# Patient Record
Sex: FEMALE | Race: WHITE | HISPANIC OR LATINO | Employment: FULL TIME | URBAN - METROPOLITAN AREA
[De-identification: names, ages, dates, MRNs, and addresses within clinical notes are randomized per-mention and may not be internally consistent; named-entity substitution may affect disease eponyms.]

---

## 2022-02-27 ENCOUNTER — HISTORICAL (OUTPATIENT)
Dept: ADMINISTRATIVE | Facility: HOSPITAL | Age: 32
End: 2022-02-27

## 2022-03-01 ENCOUNTER — HISTORICAL (OUTPATIENT)
Dept: ADMINISTRATIVE | Facility: HOSPITAL | Age: 32
End: 2022-03-01

## 2022-03-04 ENCOUNTER — HISTORICAL (OUTPATIENT)
Dept: ADMINISTRATIVE | Facility: HOSPITAL | Age: 32
End: 2022-03-04

## 2022-03-07 ENCOUNTER — HISTORICAL (OUTPATIENT)
Dept: INTERNAL MEDICINE | Facility: CLINIC | Age: 32
End: 2022-03-07

## 2022-03-07 LAB — B-HCG FREE SERPL-ACNC: NORMAL [IU]/L

## 2022-04-19 ENCOUNTER — PATIENT OUTREACH (OUTPATIENT)
Dept: EMERGENCY MEDICINE | Facility: HOSPITAL | Age: 32
End: 2022-04-19

## 2022-04-22 ENCOUNTER — PATIENT OUTREACH (OUTPATIENT)
Dept: EMERGENCY MEDICINE | Facility: HOSPITAL | Age: 32
End: 2022-04-22
Payer: MEDICAID

## 2022-05-02 ENCOUNTER — PATIENT OUTREACH (OUTPATIENT)
Dept: EMERGENCY MEDICINE | Facility: HOSPITAL | Age: 32
End: 2022-05-02
Payer: MEDICAID

## 2022-05-14 NOTE — PROGRESS NOTES
Patient:   Floridalma Desai             MRN: [id number]            FIN: [id number]               Age:   31 years     Sex:  Female     :  1990   Associated Diagnoses:   None   Author:   Kush PEREZ, Amber KENNEDY      [facility] [place] trending s/p dx scope for suspected ectopic which was not discovered at time of surgery (HCG 14k with no IUP on sono)  Discussed positive trend favoring normal (possible multiples) pregnancy 14k  Fri -> 17k Sat -> today 29k  Precautions reinforced to return ED for any pain or vaginal bleeding, voiced undesrstanding  Has initial OB appt for 3/21, encouraged to keep this appt date and likely would have U/S at that time  All questions answered

## 2022-06-03 ENCOUNTER — PATIENT OUTREACH (OUTPATIENT)
Dept: EMERGENCY MEDICINE | Facility: HOSPITAL | Age: 32
End: 2022-06-03
Payer: MEDICAID

## 2022-06-03 NOTE — PROGRESS NOTES
Spoke to pt for f/u call. Discussed upcoming scheduled appts and importance of f/u ob prenatal care, mental health and all providers and ancillary care. Pt denies si/hi and mental health crisis and to visit ED for mental health si/hi and mental health crisis. Pt reports that she stop seeing mental health provider, stressed importance to call for appt with menta health provider and to discuss this with her ob to get recommendations. Advise and stressed importance for pt to avoid substance/drug/alcohol/smoking use and risk with use during pregnancy and anytime. Also advised pt to also discussed with her ob mental health and drug use during pregnancy to get recommendations. Pt request to mail her [facility] education/resource on benefits of pcp, prenatal care, eating plan for pregnant women, smoking during pregnancy, dental provider list and mental health/substance abuse resources again to her, that she did not get it, she was having issues with getting her mail, but has been corrected. Verified pt address with pt. Encouragement given to pt. Pt voices understanding to instructions given and appreciation.      Appointments   Follow-Up Appt Scheduled :   No   Follow-Up Appointment Status :   Has not had opportunity to call for appointment   PCP Visit Within Year :   Yes   PCP Visit Upcoming Reason :   Sick   PCP Visit One Year Date :   3/14/2022 CDT   Follow-Up Specialist Appt Scheduled :   Yes   Type of Specialist :   ARPAN ROJAS NP (MENTAL HEALTH PROVIDER) pt reports stop seeing, encourage and stressed importance of f/u care with mental health and to call and obtain appt  DOREEN KATE MD (OB) LV-5/31/22; NV-6/14/22     Providers Patient Visited with in the Last Year :     [facility] (PCP)  ARPAN ROJAS NP (MENTAL HEALTH)  DOREEN KATE MD

## 2022-06-13 ENCOUNTER — PATIENT OUTREACH (OUTPATIENT)
Dept: EMERGENCY MEDICINE | Facility: HOSPITAL | Age: 32
End: 2022-06-13
Payer: MEDICAID

## 2022-06-13 NOTE — PROGRESS NOTES
Appointment Reminder:    Spoke to pt and remind of appt for 6/14/22 with ob Dr. Jesus, stressed importance of prenatal care and attending appt. Pt denies si/hi and mental health crisis. Pt does report occasional abdominal pain, denies any pain at this time, advised pt to call her ob and notify and get recommendation and to visit ED for symptoms. Pt voices understanding to instructions given and appreciation.

## 2022-06-15 ENCOUNTER — PATIENT OUTREACH (OUTPATIENT)
Dept: EMERGENCY MEDICINE | Facility: HOSPITAL | Age: 32
End: 2022-06-15
Payer: MEDICAID

## 2022-06-15 NOTE — PROGRESS NOTES
Spoke to pt for f/u call, doing well, no compliant. Pt reports she did attend OB appt on yesterday 6/14/22 with Dr. Jesus, next visit 7/12/22 at 2:45pm. Pt reports she is feeling better, pt denies abdominal pt, si/hi and mental health crisis. Pt also reports she her OB put her on medication Vistaril and she will  today to start taking. Discussed medication, health pregnancy diet compliance and benefits of OB and prenatal care, ED utilization and stressed importance for pt to avoid substance/drug/alcohol/smoking use and risk with use during pregnancy and anytime. Pt voices understanding to instructions given and appreciation. Pt voices understanding to instructions given and appreciation.     Appointments   Follow-Up Appt Scheduled :   No   Follow-Up Appointment Status :   Has not had opportunity to call for appointment   PCP Visit Within Year :   Yes   PCP Visit Upcoming Reason :   Sick   PCP Visit One Year Date :   3/14/2022 CDT   Follow-Up Specialist Appt Scheduled :   Yes   Type of Specialist :   ARPAN ROJAS NP (MENTAL HEALTH PROVIDER) pt reports stop seeing, encourage and stressed importance of f/u care with mental health and to call and obtain appt  DOREEN JESUS MD (OB) LV-6/14/22; NV-7/12/22 at 2:45pm     Providers Patient Visited with in the Last Year :     [facility] (PCP)  ARPAN ROJAS NP (MENTAL HEALTH)  DOREEN JESUS MD

## 2022-07-20 ENCOUNTER — PATIENT OUTREACH (OUTPATIENT)
Dept: EMERGENCY MEDICINE | Facility: HOSPITAL | Age: 32
End: 2022-07-20
Payer: MEDICAID

## 2022-07-20 NOTE — PROGRESS NOTES
original encounter date 4/22/22 in [facility] EMR information placed in Epic for coninuity purposes.               [facility] Entered On:  4/22/2022 11:32 CDT    Performed On:  4/22/2022 10:56 CDT by Mariana Gilbert LPN               Discharge Past 30 Days   Visit to the Hospital in the Last 30 Days :   Emergency department (ED) visit   Reason for Choosing ED for Care :   Believes the problem too serious for doctor office or clinic   Perception of Change in Health Status DC :   Improving   Discharged To :   Home independently   DC Instructions :   Received discharge instructions , Understands discharge instructions    Education Provided :   ED utilization, Importance of keeping appointments, Community resources, Medication Compliance, Diet Compliance, Other: BENEFITS OF PCP, OB AND MENTAL HEALTH PROVIDER AND ALL PROVIDERS AND ANCILLARY CARE   (Comment: healthy pregnancy eating diet [Mariana Gilbert LPN - 4/22/2022 10:56 CDT] )   Discharge Past 30 Days Addntl Comments :   SPOKE TO PT FOR F/U ED VISIT. ADVISED PT TO FOLLOW DISCHARGE INSTRUCTIONS. STRESSED IMPORTANCE OF F/U CARE WITH OB, MENTAL HEALTH AND PCP AND ATTENDING ALL SCHEDULED APPTS AND BEING COMPLIANT WITH CARE AND TREATMENT PLAN. PT VOICES UNDERSTANDING TO INSTRUCTIONS GIVEN AND APPRECIATION.     Patient pregnant :   Yes   (Comment: EDC 10/31/22; OB--DR. DOREEN KATE; LV-4/5/22; NV-5/3/22 [Mariana Gilbert LPN - 4/22/2022 10:56 CDT] )   Mariana Gilbert LPN - 4/22/2022 10:56 CDT   Barriers to Care   SDoH Eat Less Than You Should :   No   SDoH Shut Off Services to Your Home :   Yes   SDoH No Regular Place to Live :   No   SDoH Needed Provider but Costs too Much :   No   SDoH Help Reading and Writing Paper Work :   No   SDoH Feel Lonely Often :   No   SDoH Missed Appt/Meds- No Transportation :   No   SDoH Call Dr To Be Seen Right Away :   Yes   SDoH Medical Problems Cause ED Visit :   No   SDoH Other Problems Affecting  Health :   Yes   SDoH Other Problems Details :   Drug abuse, Mental health concerns   (Comment: PT REPORTS SHE HAS USE MARJUANNA DURING THIS PREGNACY, ENCOURAGE AND ADVISED PT TO AVOID SUBSTANCE(DRUG/ALCOHOL USE), DISCUSSED RISK WITH DOING SO. ADVISED PT TO DISCUSS THIS WITH HER OB AND MENTAL HEALTH PROVIDER TO GET RECOMMENDATIONS. PT VOICES UNDERSTANDING TO INSTRUCTIONS GIVEN. [Mariana Gilbert LPN - 4/22/2022 10:56 CDT] )   SDoH Reviewed :   Yes   SDoH Dentist Seen in Last Year :   No   (Comment: DENTAL PROVIDER [employer] MAIL TO PT PER PT REQUEST [Mariana Gilbert LPN - 4/22/2022 10:56 CDT] )   Mariana Gilbert LPN - 4/22/2022 10:56 CDT   Prescriptions   Medication Reconcilation Completed :   Unknown   Medication Prescriptions Filled After DC :   Confirms all medications filled , Confirms taking medications as prescribed    Questions About Meds Prescribed at DC :   Denies any questions or concerns                    Mariana Gilbert LPN - 4/22/2022 10:56 CDT   Appointments   Follow-Up Appt Scheduled :   No   Follow-Up Appointment Status :   Has not had opportunity to call for appointment   PCP Visit Within Year :   Yes   PCP Visit Upcoming Reason :   Sick   PCP Visit One Year Date :   3/14/2022 CDT   Follow-Up Specialist Appt Scheduled :   Yes   Type of Specialist :   ARPAN ROJAS NP (MENTAL HEALTH PROVIDER)  DOREEN KAET MD (OB) LV-4/5/22; NV-5/3/22   Mariana Gilbert LPN - 4/22/2022 10:56 CDT   Navigation   Initial Assesment Completed By :   Phone   ED FIN :   [id number]   [facility] Navigation Call Log :   First follow up call   Transportation Arrangements Made :   Yes   Providers Patient Visited Last Year :   [facility] (PCP)  ARPAN ROJAS NP (MENTAL HEALTH)  DOREEN KATE MD (OB)     Root Causes for High-ED Utilization :   Chronic conditions, Mental Health Care, Drug use   Plan: :   Educated on appropriate ED utilization, Educated on alternate  means of health care; ie urgent care when PCP not available, Educated on importance of follow up care with PCP, Referred to community resources; ie [place], Educated on importance of follow up preventative dental care with dentist, Other: MAIL [facility] EDUCATION/RESOURCE TO PT PER PT REQUEST, VERIFIED PT ADDRESS WITH PT   Participation in Activity Designed to Address Lack of Annual Ambulatory or Preventative Care Visit? :   Yes   Participation in Activity Designed to Address Avoidable ED Utilization? :   Yes   During Current Measurement Year, Did Enrollee Receive Education Regarding Outpatient Primary Care Options? :   Yes   During Current Measurement Year, Did Enrollee Receive an Appt Reminder 24-48 Hours Before a Scheduled Appt? :   Yes   During Current Measurement Year, Did the Network Provider Schedule and Appt or Provide a Referral to Enrollee? :   Yes   Education Provided :   Verbal, Written   Ofelia TIRADO, Mariana Rubio - 4/22/2022 10:56 CDT

## 2022-07-20 NOTE — PATIENT INSTRUCTIONS
* Final Report *    Education Note (Verified)  Patient Education Materials Follows:  Why Should I Have My Own Doctor or Nurse Practitioner (PCP) to Take Care of Me  What is a PCP (Primary Care Provider)?                    A primary care provider is a doctor or nurse practitioner who you can call for an appointment and will see you when you are sick.    You will also be seen at scheduled appointment times during the year to check on your diabetes, or high blood pressure, or heart disease.    Why see the same PCP (doctor/nurse practitioner)?  You can be seen faster when you are sick                                                                                                                                                                                                                                                                                                          You, the PCP (doctor/nurse practitioner) and the office staff get to know each other; you begin to trust them to care for you. You take part in your health choices.   All of you together are a team.    Your medicine is looked at every time you visit, to be sure you are taking the medicine, as the PCP (doctor/nurse practitioner) ordered.  Your PCP (doctor/nurse practitioner) and their staff help keep you healthy and out of the hospital.  They can catch sicknesses earlier by ordering tests once a year to stop or prevent the sickness from getting worse.                                                     Your PCP (doctor/nurse practitioner) can send you to providers who specialize (heart/bone/lung) if you need.  They and their office staff help keep track of your seeing other providers (doctors/nurse practitioners) and tests (CT/ MRIs/ X Rays)) taken.                                          PCPs want you to stay healthy.  Let us care for you.                                            How does drug/alcohol abuse affect your  health?    Drug and alcohol abuse can cause your breathing to slow down enough for you to stop breathing. It can cause you to see things that are not there. Keep you from sleeping for days.  Continued use of drugs and alcohol will weaken your heart, liver, and kidneys.    Mental Health/Substance Abuse  [facility]:  [phone]  [facility]: [phone]  [facility]: [phone]    [facility]  [address] [place] [address]  Phone: [phone]  Fax:  [phone]    [facility]  [address]  [address]  Phone:  [phone]  Fax:  [phone]    [facility]  [address] [place]  [address]  Phone:  [phone]  Fax:  [phone]    [facility]  [address] [place]  [address]  Phone:  [phone]  Fax:  [phone]    [facility]  [address] [place] [address]  Phone: [phone]  Fax:  [phone]    [facility]  [address] [place]  [address]  Phone:  [phone]  Fax: [phone]    [facility]  [address]  [place], [place]  Phone: [phone]    [facility]  [address] [place] [address] [address]  [place], [place]  Phone: [phone]                            Why is taking care of your mouth/teeth/gums important?                                                                             Your mouth is the opening to your body.  If not kept clean, it can let in sickness to the rest of your body.                                                 Oral Health Care (Dentists)  [facility].            [address] [place] [address], [phone]  [facility],             [address] [facility] [place] [address] [phone]  [facility]            [address] [place] [address], [phone]  [facility]            [address] [place] [address], [phone]  [facility]            [address] [place] [address] [phone]  [facility]            [address] [place] [address] [phone]  [facility]             [address] [facility] [address], [place] [address] [phone]  [facility], [address] [facility] [place] [place] [address] [phone]  [facility]             [address] [place], [place] [id number] [phone]  [facility]; [place]: [phone]   [facility]; [place]: [phone]    [facility], [address] [facility] [address], [phone]  (Accepts [employer], HB and [employer])  [facility];[address] [place] [address]; [phone]  (ACCEPTS [facility], HB AND [employer])             Obstetrics and Gynecology  Prenatal Care  Prenatal care is health care during pregnancy. It helps you and your unborn baby (fetus) stay as healthy as possible. Prenatal care may be provided by a midwife, a family practice health care provider, or a childbirth and pregnancy specialist (obstetrician).  How does this affect me?  During pregnancy, you will be closely monitored for any new conditions that might develop. To lower your risk of pregnancy complications, you and your health care provider will talk about any underlying conditions you have.  How does this affect my baby?  Early and consistent prenatal care increases the chance that your baby will be healthy during pregnancy. Prenatal care lowers the risk that your baby will be:   Born early (prematurely).   Smaller than expected at birth (small for gestational age).  What can I expect at the first prenatal  care visit?  Your first prenatal care visit will likely be the longest. You should schedule your first prenatal care visit as soon as you know that you are pregnant. Your first visit is a good time to talk about any questions or concerns you have about pregnancy. At your visit, you and your health care provider will talk about:   Your medical history, including:  ? Any past pregnancies.  ? Your family's medical history.  ? The baby's father's medical history.  ? Any long-term (chronic) health conditions you have and how you manage them.  ? Any surgeries or procedures you have had.  ? Any current over-the-counter or prescription medicines, herbs, or supplements you are taking.   Other factors that could pose a risk to your baby, including:   Your home setting and your stress levels, including:  ? Exposure to abuse or violence.  ? Household financial strain.  ? Mental health conditions you have.   Your daily health habits, including diet and exercise.  Your health care provider will also:   Measure your weight, height, and blood pressure.   Do a physical exam, including a pelvic and breast exam.   Perform blood tests and urine tests to check for:  ? Urinary tract infection.  ? Sexually transmitted infections (STIs).  ? Low iron levels in your blood (anemia).  ? Blood type and certain proteins on red blood cells (Rh antibodies).  ? Infections and immunity to viruses, such as hepatitis B and rubella.  ? HIV (human immunodeficiency virus).   Do an ultrasound to confirm your baby's growth and development and to help predict your estimated due date (KAYLYN). This ultrasound is done with a probe that is inserted into the vagina (transvaginal ultrasound).   Discuss your options for genetic screening.   Give you information about how to keep yourself and your baby healthy, including:  ? Nutrition and taking vitamins.  ? Physical activity.  ? How to manage pregnancy symptoms such as nausea and vomiting (morning  sickness).  ? Infections and substances that may be harmful to your baby and how to avoid them.  ? Food safety.  ? Dental care.  ? Working.  ? Travel.  ? Warning signs to watch for and when to call your health care provider.  How often will I have prenatal care visits?  After your first prenatal care visit, you will have regular visits throughout your pregnancy. The visit schedule is often as follows:   Up to week 28 of pregnancy: once every 4 weeks.   28-36 weeks: once every 2 weeks.   After 36 weeks: every week until delivery.  Some women may have visits more or less often depending on any underlying health conditions and the health of the baby.  Keep all follow-up and prenatal care visits as told by your health care provider. This is important.  What happens during routine prenatal care visits?    Your health care provider will:   Measure your weight and blood pressure.   Check for fetal heart sounds.   Measure the height of your uterus in your abdomen (fundal height). This may be measured starting around week 20 of pregnancy.   Check the position of your baby inside your uterus.   Ask questions about your diet, sleeping patterns, and whether you can feel the baby move.   Review warning signs to watch for and signs of labor.   Ask about any pregnancy symptoms you are having and how you are dealing with them. Symptoms may include:  ? Headaches.  ? Nausea and vomiting.  ? Vaginal discharge.  ? Swelling.  ? Fatigue.  ? Constipation.  ? Any discomfort, including back or pelvic pain.  Make a list of questions to ask your health care provider at your routine visits.  What tests might I have during prenatal care visits?  You may have blood, urine, and imaging tests throughout your pregnancy, such as:   Urine tests to check for glucose, protein, or signs of infection.   Glucose tests to check for a form of diabetes that can develop during pregnancy (gestational diabetes mellitus). This is usually done around week 24 of  pregnancy.   An ultrasound to check your baby's growth and development and to check for birth defects. This is usually done around week 20 of pregnancy.   A test to check for group B strep (GBS) infection. This is usually done around week 36 of pregnancy.   Genetic testing. This may include blood or imaging tests, such as an ultrasound. Some genetic tests are done during the first trimester and some are done during the second trimester.  What else can I expect during prenatal care visits?  Your health care provider may recommend getting certain vaccines during pregnancy. These may include:   A yearly flu shot (annual influenza vaccine). This is especially important if you will be pregnant during flu season.   Tdap (tetanus, diphtheria, pertussis) vaccine. Getting this vaccine during pregnancy can protect your baby from whooping cough (pertussis) after birth. This vaccine may be recommended between weeks 27 and 36 of pregnancy.  Later in your pregnancy, your health care provider may give you information about:   Childbirth and breastfeeding classes.   Choosing a health care provider for your baby.   Umbilical cord banking.   Breastfeeding.   Birth control after your baby is born.   [facility] labor and delivery unit and how to tour it.   Registering at the hospital before you go into labor.  Where to find more information   Office on Women's Health: womenshealth.gov   American Pregnancy Association: americanpregnancy.org   March of Dimes: marchofdimes.org  Summary   Prenatal care helps you and your baby stay as healthy as possible during pregnancy.   Your first prenatal care visit will most likely be the longest.   You will have visits and tests throughout your pregnancy to monitor your health and your baby's health.   Bring a list of questions to your visits to ask your health care provider.   Make sure to keep all follow-up and prenatal care visits with your health care provider.  This information is not intended  "to replace advice given to you by your health care provider. Make sure you discuss any questions you have with your health care provider.  Document Released: 12/20/2004 Document Revised: 12/17/2018 Document Reviewed: 12/17/2018  [employer] Interactive Patient Education © 2019 [employer]    Eating Plan for Pregnant Women  While you are pregnant, your body requires additional nutrition to help support your growing baby. You also have a higher need for some vitamins and minerals, such as folic acid, calcium, iron, and vitamin D. Eating a healthy, well-balanced diet is very important for your health and your baby's health. Your need for extra calories varies for the three 3-month segments of your pregnancy (trimesters). For most women, it is recommended to consume:   150 extra calories a day during the first trimester.   300 extra calories a day during the second trimester.   300 extra calories a day during the third trimester.  What are tips for following this plan?     Do not try to lose weight or go on a diet during pregnancy.   Limit your overall intake of foods that have "empty calories." These are foods that have little nutritional value, such as sweets, desserts, candies, and sugar-sweetened beverages.   Eat a variety of foods (especially fruits and vegetables) to get a full range of vitamins and minerals.   Take a prenatal vitamin to help meet your additional vitamin and mineral needs during pregnancy, specifically for folic acid, iron, calcium, and vitamin D.   Remember to stay active. Ask your health care provider what types of exercise and activities are safe for you.   Practice good food safety and cleanliness. Wash your hands before you eat and after you prepare raw meat. Wash all fruits and vegetables well before peeling or eating. Taking these actions can help to prevent food-borne illnesses that can be very dangerous to your baby, such as listeriosis. Ask your health care provider for more information " about listeriosis.  What does 150 extra calories look like?  Healthy options that provide 150 extra calories each day could be any of the followin-8 oz (170-230 g) of plain low-fat yogurt with ½ cup of berries.   1 apple with 2 teaspoons (11 g) of peanut butter.   Cut-up vegetables with ¼ cup (60 g) of hummus.   8 oz (230 mL) or 1 cup of low-fat chocolate milk.   1 stick of string cheese with 1 medium orange.   1 peanut butter and jelly sandwich that is made with one slice of whole-wheat bread and 1 tsp (5 g) of peanut butter.  For 300 extra calories, you could eat two of those healthy options each day.  What is a healthy amount of weight to gain?  The right amount of weight gain for you is based on your BMI before you became pregnant. If your BMI:   Was less than 18 (underweight), you should gain 28-40 lb (13-18 kg).   Was 18-24.9 (normal), you should gain 25-35 lb (11-16 kg).   Was 25-29.9 (overweight), you should gain 15-25 lb (7-11 kg).   Was 30 or greater (obese), you should gain 11-20 lb (5-9 kg).  What if I am having twins or multiples?  Generally, if you are carrying twins or multiples:   You may need to eat 300-600 extra calories a day.   The recommended range for total weight gain is 25-54 lb (11-25 kg), depending on your BMI before pregnancy.   Talk with your health care provider to find out about nutritional needs, weight gain, and exercise that is right for you.  What foods can I eat?    Grains  All grains. Choose whole grains, such as whole-wheat bread, oatmeal, or brown rice.  Vegetables  All vegetables. Eat a variety of colors and types of vegetables. Remember to wash your vegetables well before peeling or eating.  Fruits  All fruits. Eat a variety of colors and types of fruit. Remember to wash your fruits well before peeling or eating.  Meats and other protein foods  Lean meats, including chicken, turkey, fish, and lean cuts of beef, veal, or pork. If you eat fish or seafood, choose options  "that are higher in omega-3 fatty acids and lower in mercury, such as salmon, herring, mussels, trout, sardines, pollock, shrimp, crab, and lobster. Tofu. Tempeh. Beans. Eggs. Peanut butter and other nut butters. Make sure that all meats, poultry, and eggs are cooked to food-safe temperatures or "well-done."  Two or more servings of fish are recommended each week in order to get the most benefits from omega-3 fatty acids that are found in seafood. Choose fish that are lower in mercury. You can find more information online:   www.fda.gov  Dairy  Pasteurized milk and milk alternatives (such as almond milk). Pasteurized yogurt and pasteurized cheese. Cottage cheese. Sour cream.  Beverages  Water. Juices that contain 100% fruit juice or vegetable juice. Caffeine-free teas and decaffeinated coffee.  Drinks that contain caffeine are okay to drink, but it is better to avoid caffeine. Keep your total caffeine intake to less than 200 mg each day (which is 12 oz or 355 mL of coffee, tea, or soda) or the limit as told by your health care provider.  Fats and oils  Fats and oils are okay to include in moderation.  Sweets and desserts  Sweets and desserts are okay to include in moderation.  Seasoning and other foods  All pasteurized condiments.  The items listed above may not be a complete list of recommended foods and beverages. Contact your dietitian for more options.  What foods are not recommended?  Vegetables  Raw (unpasteurized) vegetable juices.  Fruits  Unpasteurized fruit juices.  Meats and other protein foods  Lunch meats, bologna, hot dogs, or other deli meats. (If you must eat those meats, reheat them until they are steaming hot.) Refrigerated paté, meat spreads from a meat counter, smoked seafood that is found in the refrigerated section of a store. Raw or undercooked meats, poultry, and eggs. Raw fish, such as sushi or sashimi. Fish that have high mercury content, such as tilefish, shark, swordfish, and heavenly " mackerel.  To learn more about mercury in fish, talk with your health care provider or look for online resources, such as:   www.fda.gov  Dairy  Raw (unpasteurized) milk and any foods that have raw milk in them. Soft cheeses, such as feta, queso gutierrez, queso fresco, Brie, Camembert cheeses, blue-veined cheeses, and Panela cheese (unless it is made with pasteurized milk, which must be stated on the label).  Beverages  Alcohol. Sugar-sweetened beverages, such as sodas, teas, or energy drinks.  Seasoning and other foods  Homemade fermented foods and drinks, such as pickles, sauerkraut, or kombucha drinks. (Store-bought pasteurized versions of these are okay.)  Salads that are made in a store or deli, such as ham salad, chicken salad, egg salad, tuna salad, and seafood salad.  The items listed above may not be a complete list of foods and beverages to avoid. Contact your dietitian for more information.  Where to find more information  To calculate the number of calories you need based on your height, weight, and activity level, you can use an online calculator such as:   www.[employer] Groupmyplate.gov/MyPlatePlan  To calculate how much weight you should gain during pregnancy, you can use an online pregnancy weight gain calculator such as:   www.choosemyplate.gov/pregnancy-weight-gain-calculator  Summary   While you are pregnant, your body requires additional nutrition to help support your growing baby.   Eat a variety of foods, especially fruits and vegetables to get a full range of vitamins and minerals.   Practice good food safety and cleanliness. Wash your hands before you eat and after you prepare raw meat. Wash all fruits and vegetables well before peeling or eating. Taking these actions can help to prevent food-borne illnesses, such as listeriosis, that can be very dangerous to your baby.   Do not eat raw meat or fish. Do not eat fish that have high mercury content, such as tilefish, shark, swordfish, and heavenly mackerel.  Do not eat unpasteurized (raw) dairy.   Take a prenatal vitamin to help meet your additional vitamin and mineral needs during pregnancy, specifically for folic acid, iron, calcium, and vitamin D.  This information is not intended to replace advice given to you by your health care provider. Make sure you discuss any questions you have with your health care provider.  Document Released: 10/02/2015 Document Revised: 09/14/2018 Document Reviewed: 09/14/2018  [employer] Interactive Patient Education © 2019 [employer]    Smoking During Pregnancy    Smoking during pregnancy is unhealthy for you and your baby. Smoke from cigarettes, pipes, and cigars contains many chemicals that can cause cancer (carcinogens). Cigarettes also contain a stimulant drug (nicotine). When you smoke, harmful substances that you breathe in enter your bloodstream and can be passed on to your baby. This can affect your baby's development.  If you are planning to become pregnant or have recently become pregnant, talk with your health care provider about quitting smoking.  How does smoking affect me?  Smoking increases your risk for many long-term (chronic) diseases. These diseases include cancer, lung diseases, and heart disease. Smoking during pregnancy increases your risk of:   Losing the pregnancy (miscarriage or stillbirth).   Giving birth too early (premature birth).   Pregnancy outside of the uterus (tubal pregnancy).   Having problems with the organ that provides the baby nourishment and oxygen (placenta), including:  ? Attachment of the placenta over the opening of the uterus (placenta previa).  ? Detachment of the placenta before the baby's birth (placental abruption).   Having your water break before labor begins (premature rupture of membranes).  How does smoking affect my baby?  Before Birth  Smoking during pregnancy:   Decreases blood flow and oxygen to your baby.   Increases your babys risk of birth defects, such as heart  defects.   Increases your baby's heart rate.   Slows your baby's growth in the uterus (intrauterine growth retardation).  After Birth  Babies born to women who smoked during pregnancy may:   Have symptoms of nicotine withdrawal.   Need to stay in the hospital for special care.   May be too small at birth.   Have a high risk of:  ? Serious health problems or lifelong disabilities.  ? Sudden infant death syndrome (SIDS).  ? Becoming obese.  ? Developing behavior or learning problems.  What can happen if changes are not made?  When babies are born with a birth defect or illness, they often need to stay in the hospital longer before going home. Hospital stays may also be longer if you had any complications during labor or delivery. Longer hospital stays and more treatments result in higher costs for health care.  Many health issues among babies born to mothers who smoke can have a lifelong impact. This may include the long-term need for certain medicines, therapies, or other treatments.  What are the benefits of not smoking during pregnancy?  You have a much better chance of having a healthy pregnancy and a healthy baby if you do not smoke while you are pregnant. Not smoking also means that you will have a better chance of living a long and healthy life, and your baby will have a better chance of growing into a healthy child and adult.  What actions can be taken?  Quitting smoking can be difficult. Ask your health care provider for help to stop smoking. You may also consider:   Counseling to help you quit smoking (smoking cessation counseling).   Psychotherapy.   Acupuncture.   Hypnosis.   Telephone QUIT hotlines.  If these methods do not help you, talk with your health care provider about other options. Do not take smoking cessation medicines or nicotine supplements unless your health care provider tells you to.  Where to find more information  Learn more about smoking during pregnancy and quitting smoking  from:   March of Dimes: www.marchofdimes.org/pregnancy/smoking-during-pregnancy.aspx   U.S. Department of Health and Human Services: women.smokefree.gov   American Cancer Society: www.cancer.org   American Heart Association: www.heart.org   National Cancer [place]: www.cancer.gov  For help to quit smoking:   National smoking cessation telephone hotline: 8-879-QUIT NOW (925-2570)  Contact a health care provider if:   You are struggling to quit smoking.   You are a smoker and you become pregnant or plan to become pregnant.   You start smoking again after giving birth.  Summary   Tobacco smoke contains harmful substances that can affect a babys health and development.   Smoking increases the risk for serious problems, such as miscarriage, birth defects, or premature birth.   If you need help to quit smoking, ask your health care provider.  This information is not intended to replace advice given to you by your health care provider. Make sure you discuss any questions you have with your health care provider.  Document Released: 05/01/2006 Document Revised: 10/06/2017 Document Reviewed: 10/06/2017  [employer] Interactive Patient Education © 2019 [employer]        Result type: Education Note  Result date: April 22, 2022 10:50 CDT  Result status: Auth (Verified)  Result title: Education Note  Performed by: Mariana Gilbert LPN on April 22, 2022 10:50 CDT  Verified by: Mariana Gilbert LPN on April 22, 2022 10:50 CDT  Encounter info: [id number], [facility] CARE MANAGEMENT, Utilization Coordination, 4/19/2022 -

## 2022-07-20 NOTE — PROGRESS NOTES
original encounter date 4/19/22 in [facility] EMR information placed in Epic for continuity purposes.      [facility] Care Entered On:  4/19/2022 15:17 CDT    Performed On:  4/19/2022 15:12 CDT by Kaitlynn Spangler LPN               Discharge Past 30 Days   Visit to the Hospital in the Last 30 Days :   Emergency department (ED) visit   Reason for Choosing ED for Care :   Believes the problem too serious for doctor office or clinic   Perception of Change in Health Status DC :   Improving   Discharged To :   Home independently   DC Instructions :   Received discharge instructions , Understands discharge instructions    Patient pregnant :   Yes   Kaitlynn Spangler LPN - 4/19/2022 15:12 CDT   Barriers to Care   SDoH Eat Less Than You Should :   No   SDoH Shut Off Services to Your Home :   Yes   (Comment: Pt currently driving and unable to write down phone #s [Kaitlynn Spangler LPN - 4/19/2022 15:12 CDT] )   SDoH No Regular Place to Live :   No   SDoH Needed Provider but Costs too Much :   No   SDoH Help Reading and Writing Paper Work :   No   SDoH Feel Lonely Often :   No   SDoH Missed Appt/Meds- No Transportation :   No   SDoH Call Dr To Be Seen Right Away :   Yes   SDoH Other Problems Affecting Health :   Yes   SDoH Other Problems Details :   Mental health concerns   (Comment: Depressed, bipolar, not being tx r/t to pregnancy, advised pt to reach out to her OBGYN  [Kaitlynn Spangler LPN - 4/19/2022 15:12 CDT] )   SDoH Reviewed :   No   SDoH Dentist Seen in Last Year :   No   Kaitlynn Spangler LPN - 4/19/2022 15:12 CDT   Prescriptions   Medication Reconcilation Completed :   Unknown   Medication Prescriptions Filled After DC :   Confirms all medications filled , Confirms taking medications as prescribed    Questions About Meds Prescribed at DC :   Denies any questions or concerns                    Kaitlynn Spangler LPN - 4/19/2022 15:12 CDT   Appointments   Follow-Up Appt Scheduled :   No   Follow-Up Appointment  Status :   Has not had opportunity to call for appointment   Kaitlynn Spangler LPN - 4/19/2022 15:12 CDT   Navigation   Initial Assesment Completed By :   Phone   ED FIN :   [phone]   [facility] Navigation Call Log :   [facility]   Referral To  Care :   [facility] Navigation   Kaitlynn Spangler LPN - 4/19/2022 15:12 CDT

## 2022-08-16 ENCOUNTER — PATIENT OUTREACH (OUTPATIENT)
Dept: EMERGENCY MEDICINE | Facility: HOSPITAL | Age: 32
End: 2022-08-16

## 2022-08-16 NOTE — PROGRESS NOTES
Spoke to pt for f/u call, doing well, no complaints. Discussed medication and healthy pregnancy eating compliance, benefits of pcp and ob care and stressed importance of f/u care and being compliant with health care and treatment plan. Pt reports + fetal movement and denies drug use. Stressed importance to pt to avoid drug/alcohol use and risk with use. Pt voices understanding to instructions given and appreciation.

## 2022-10-13 NOTE — PROGRESS NOTES
Spoke to pt for f/u call, pt reports fetal movement and attend ob appt this week and having anxiety which she did discussed with her ob and rxs was given to her to start taking. Discussed medication and healthy pregnancy diet compliance, avoid drug use, ED utilization, stressed importance of ob care and attending all appts and being compliant with treatment plan. Pt denies si/hi and mental health crisis. Pt voices understanding to instructions given and appreciation.

## 2022-10-18 ENCOUNTER — PATIENT OUTREACH (OUTPATIENT)
Dept: EMERGENCY MEDICINE | Facility: HOSPITAL | Age: 32
End: 2022-10-18
Payer: MEDICAID

## 2022-10-19 NOTE — PROGRESS NOTES
Spoke to pt for f/u, doing well, pt reports in the fetal movement. Discussed medication and healthy pregnancy diet compliance, ob care and mental health care. Pt denies si/hi and mental health crisis. Pt reports she will be admitted on 10/24/22 for [place]. Pt also reports attended OB appt today with Dr. Mata, pt voices understanding to instructions given and appreciation.

## 2022-10-27 ENCOUNTER — PATIENT OUTREACH (OUTPATIENT)
Dept: EMERGENCY MEDICINE | Facility: HOSPITAL | Age: 32
End: 2022-10-27
Payer: MEDICAID

## 2022-10-27 NOTE — PROGRESS NOTES
Spoke to pt for f/u and post delivery. Pt reports she had a normal delivery on Monday 10/24/22 and tubal ligation on Tuesday 10/25/22, pt reports pain to incision area, denies fever and denies redness, drainage or swelling to area and pain rx that was given is not helping the pain, also reports no bm since she had her baby on Monday 10/24/22 and breast feeding with hardness to breast area. Pt reports baby girl is doing well and has pediatrician appt on tomorrow 10/28/22 at 9 am. Pt denies si/hi and mental health crisis and drug use. Discussed medication and eating fruits and vegetables fiber diet and drink fluids, ED utilization and to call ob/gyn to notify of concerns and to try to get a sooner appt., offered pt to call on a conference call with pt to reach out to OB/GYN to notify, pt wish for assistance with this and ok for conference call with ob, called office via conference call, no answer. Advised pt will try again. Pt reports she does have help with care for her and baby at home. Pt voices understanding to instructions given and appreciation.   Tried calling Dr. Jesus's (OB/GYN) office again through [facility], and they transferred call to Dr. Jesus's office and spoke to nurse and advised of pt's concerns & conference to pt with nurse, both parties aware of conference call, pt notified of incision pain, no bm since delivery on monday 10/24/22 & breast feeding with breast hardness. appt given for tomorrow at 11am 10/28/22 with Dr. Jesus OB/GYN to be evaluated and f/u and nurse advised pt that she will notify lactation consultant to reach out to pt to discuss breast feeding issue and for pt to use over the counter med to help with bm, like fleets enemia, miralax and to use colace once bm to help with bm. Pt voices understanding to instructions given and appreciation.     Appointment:    10/28/22 at 11 am Dr. Jitendra Jesus (ob/gyn)     PCP Visit One Year Date :   3/14/2022 CDT    Follow-Up Specialist Appt Scheduled :   Yes        Providers Patient Visited with in the Last Year :     [facility] (PCP)  ARPAN ROJAS NP (MENTAL HEALTH)  DOREEN KATE MD

## 2022-11-02 ENCOUNTER — PATIENT OUTREACH (OUTPATIENT)
Dept: EMERGENCY MEDICINE | Facility: HOSPITAL | Age: 32
End: 2022-11-02
Payer: MEDICAID

## 2022-11-03 NOTE — PROGRESS NOTES
Spoke to pt for f/u. Pt reports her new born was hospital admit on 11/1/22 and discharged today 11/3/22 for elevated bilirubin. Pt reports baby is doing well and breast feeding has gotten better and that the lactation nurse did reach out to her. Discussed medication and healthy eating and fruits and vegetables and drink water, upcoming appts and stressed importance of f/u care with ob/gyn and pediatrician for baby. Pt report still with some discomfort to right abdomen area and her ob/gyn is aware and told her to continue taking her pain med. Advised and encouraged pt to call her ob/gyn to notify still with symptoms and to get recommendations and for sooner appt to be evaluated and to visit ED for worsening symptoms. Pt denies fever, chills, problems with incision from tubal ligation incision area and no other concerns. Pt voices understanding to instructions given and appreciation.    Appointments:    Ob/gyn lv 10/28/22; nv 12/2/22  Providers Patient Visited with in the Last Year :     [facility] (PCP)  ARPAN ROJAS NP (MENTAL HEALTH)  DOREEN KATE MD

## 2022-11-21 ENCOUNTER — PATIENT OUTREACH (OUTPATIENT)
Dept: EMERGENCY MEDICINE | Facility: HOSPITAL | Age: 32
End: 2022-11-21

## 2023-08-12 ENCOUNTER — HOSPITAL ENCOUNTER (EMERGENCY)
Facility: HOSPITAL | Age: 33
Discharge: HOME | End: 2023-08-12
Attending: STUDENT IN AN ORGANIZED HEALTH CARE EDUCATION/TRAINING PROGRAM
Payer: MEDICAID

## 2023-08-12 VITALS
HEIGHT: 64 IN | BODY MASS INDEX: 37.63 KG/M2 | DIASTOLIC BLOOD PRESSURE: 89 MMHG | SYSTOLIC BLOOD PRESSURE: 112 MMHG | WEIGHT: 220.44 LBS | HEART RATE: 54 BPM | OXYGEN SATURATION: 99 % | TEMPERATURE: 98 F | RESPIRATION RATE: 18 BRPM

## 2023-08-12 DIAGNOSIS — R07.9 CHEST PAIN: Primary | ICD-10-CM

## 2023-08-12 LAB
ALBUMIN SERPL-MCNC: 3.6 G/DL (ref 3.5–5)
ALBUMIN/GLOB SERPL: 1.1 RATIO (ref 1.1–2)
ALP SERPL-CCNC: 48 UNIT/L (ref 40–150)
ALT SERPL-CCNC: 14 UNIT/L (ref 0–55)
AST SERPL-CCNC: 14 UNIT/L (ref 5–34)
B-HCG UR QL: NEGATIVE
BASOPHILS # BLD AUTO: 0.03 X10(3)/MCL
BASOPHILS NFR BLD AUTO: 0.5 %
BILIRUB SERPL-MCNC: 0.2 MG/DL
BUN SERPL-MCNC: 13.4 MG/DL (ref 7–18.7)
CALCIUM SERPL-MCNC: 8.8 MG/DL (ref 8.4–10.2)
CHLORIDE SERPL-SCNC: 109 MMOL/L (ref 98–107)
CO2 SERPL-SCNC: 21 MMOL/L (ref 22–29)
CREAT SERPL-MCNC: 0.65 MG/DL (ref 0.55–1.02)
CTP QC/QA: YES
EOSINOPHIL # BLD AUTO: 0.19 X10(3)/MCL (ref 0–0.9)
EOSINOPHIL NFR BLD AUTO: 2.9 %
ERYTHROCYTE [DISTWIDTH] IN BLOOD BY AUTOMATED COUNT: 14.3 % (ref 11.5–17)
GFR SERPLBLD CREATININE-BSD FMLA CKD-EPI: >60 MLS/MIN/1.73/M2
GLOBULIN SER-MCNC: 3.3 GM/DL (ref 2.4–3.5)
GLUCOSE SERPL-MCNC: 82 MG/DL (ref 74–100)
HCT VFR BLD AUTO: 39.1 % (ref 37–47)
HGB BLD-MCNC: 12.8 G/DL (ref 12–16)
IMM GRANULOCYTES # BLD AUTO: 0.01 X10(3)/MCL (ref 0–0.04)
IMM GRANULOCYTES NFR BLD AUTO: 0.2 %
LYMPHOCYTES # BLD AUTO: 2.29 X10(3)/MCL (ref 0.6–4.6)
LYMPHOCYTES NFR BLD AUTO: 35.5 %
MCH RBC QN AUTO: 28.8 PG (ref 27–31)
MCHC RBC AUTO-ENTMCNC: 32.7 G/DL (ref 33–36)
MCV RBC AUTO: 88.1 FL (ref 80–94)
MONOCYTES # BLD AUTO: 0.44 X10(3)/MCL (ref 0.1–1.3)
MONOCYTES NFR BLD AUTO: 6.8 %
NEUTROPHILS # BLD AUTO: 3.49 X10(3)/MCL (ref 2.1–9.2)
NEUTROPHILS NFR BLD AUTO: 54.1 %
NRBC BLD AUTO-RTO: 0 %
PLATELET # BLD AUTO: 235 X10(3)/MCL (ref 130–400)
PMV BLD AUTO: 9.3 FL (ref 7.4–10.4)
POTASSIUM SERPL-SCNC: 3.9 MMOL/L (ref 3.5–5.1)
PROT SERPL-MCNC: 6.9 GM/DL (ref 6.4–8.3)
RBC # BLD AUTO: 4.44 X10(6)/MCL (ref 4.2–5.4)
SODIUM SERPL-SCNC: 137 MMOL/L (ref 136–145)
TROPONIN I SERPL-MCNC: <0.01 NG/ML (ref 0–0.04)
WBC # SPEC AUTO: 6.45 X10(3)/MCL (ref 4.5–11.5)

## 2023-08-12 PROCEDURE — 99285 EMERGENCY DEPT VISIT HI MDM: CPT | Mod: 25

## 2023-08-12 PROCEDURE — 81025 URINE PREGNANCY TEST: CPT | Performed by: STUDENT IN AN ORGANIZED HEALTH CARE EDUCATION/TRAINING PROGRAM

## 2023-08-12 PROCEDURE — 93005 ELECTROCARDIOGRAM TRACING: CPT

## 2023-08-12 PROCEDURE — 85025 COMPLETE CBC W/AUTO DIFF WBC: CPT | Performed by: STUDENT IN AN ORGANIZED HEALTH CARE EDUCATION/TRAINING PROGRAM

## 2023-08-12 PROCEDURE — 84484 ASSAY OF TROPONIN QUANT: CPT | Performed by: STUDENT IN AN ORGANIZED HEALTH CARE EDUCATION/TRAINING PROGRAM

## 2023-08-12 PROCEDURE — 80053 COMPREHEN METABOLIC PANEL: CPT | Performed by: STUDENT IN AN ORGANIZED HEALTH CARE EDUCATION/TRAINING PROGRAM

## 2023-08-12 NOTE — DISCHARGE INSTRUCTIONS
Try taking over-the-counter anti-inflammatories for your pain.  Follow up with the primary care physician, referral has been sent for Family Medicine here, return to the ER with any new or worsening symptoms.

## 2023-08-12 NOTE — ED PROVIDER NOTES
Encounter Date: 8/12/2023       History     Chief Complaint   Patient presents with    Chest Pain     Pt c/o chest tightness radiating down left arm x1 week      Patient presents to the emergency department complaining of chest pain.  She states he has been going on for about a week now.  She describes it as a sharp pain in the left upper chest and left upper back, in his sometimes she will have pain shooting down her arm with tingling in the arm.  She states that it normally happens at rest, in his worse when she tries to move her neck and move her left arm.  She denies any weakness in the arm.  She denies any cough or congestion or runny nose.  It had on cardiac history.  No pain or swelling in her legs.    The history is provided by the patient.     Review of patient's allergies indicates:  No Known Allergies  No past medical history on file.  No past surgical history on file.  No family history on file.     Review of Systems   Constitutional:  Negative for chills and fever.   HENT:  Negative for congestion and sore throat.    Respiratory:  Negative for cough and shortness of breath.    Cardiovascular:  Positive for chest pain. Negative for palpitations.   Gastrointestinal:  Negative for abdominal pain and nausea.   Genitourinary:  Negative for dysuria and hematuria.   Musculoskeletal:  Negative for arthralgias and myalgias.   Neurological:  Negative for dizziness and weakness.       Physical Exam     Initial Vitals [08/12/23 0448]   BP Pulse Resp Temp SpO2   118/76 62 18 97.5 °F (36.4 °C) 100 %      MAP       --         Physical Exam    Nursing note and vitals reviewed.  Constitutional: She appears well-developed and well-nourished.   HENT:   Head: Normocephalic and atraumatic.   Eyes: EOM are normal. Pupils are equal, round, and reactive to light.   Neck: Neck supple.   Normal range of motion.  Cardiovascular:  Normal rate, regular rhythm and normal heart sounds.           Pulmonary/Chest: Breath sounds normal.  No respiratory distress. She has no wheezes. She has no rales. She exhibits tenderness.   Abdominal: Abdomen is soft. There is no abdominal tenderness.   Musculoskeletal:         General: No edema. Normal range of motion.      Cervical back: Normal range of motion and neck supple.      Comments: Pain is reproducible with moving left arm, left arm is neurovascularly intact     Neurological: She is alert and oriented to person, place, and time.   Skin: Skin is warm and dry.         ED Course   Procedures  Labs Reviewed   COMPREHENSIVE METABOLIC PANEL - Abnormal; Notable for the following components:       Result Value    Chloride 109 (*)     Carbon Dioxide 21 (*)     All other components within normal limits   CBC WITH DIFFERENTIAL - Abnormal; Notable for the following components:    MCHC 32.7 (*)     All other components within normal limits   TROPONIN I - Normal   CBC W/ AUTO DIFFERENTIAL    Narrative:     The following orders were created for panel order CBC auto differential.  Procedure                               Abnormality         Status                     ---------                               -----------         ------                     CBC with Differential[[id number]]        Abnormal            Final result                 Please view results for these tests on the individual orders.   EXTRA TUBES    Narrative:     The following orders were created for panel order EXTRA TUBES.  Procedure                               Abnormality         Status                     ---------                               -----------         ------                     Light Blue Top Hold[[id number]]                              In process                 Red Top Hold[[id number]]                                     In process                 Pink Top Hold[[id number]]                                    In process                   Please view results for these tests on the individual orders.   LIGHT BLUE TOP HOLD   RED TOP  HOLD   PINK TOP HOLD   POCT URINE PREGNANCY     EKG Readings: (Independently Interpreted)   Initial Reading: No STEMI. Rhythm: Normal Sinus Rhythm. Heart Rate: 64. Conduction: Normal. Axis: Normal.       Imaging Results              X-Ray Chest PA And Lateral (Preliminary result)  Result time 08/12/23 05:28:36      Wet Read by Kiran Akbar MD (08/12/23 05:28:36, [facility], Emergency Medicine)    No acute process                                     Medications - No data to display  Medical Decision Making:   Initial Assessment:   Stable appearing female with atypical chest pain with musculoskeletal features, we will obtain EKG blood workup and a chest x-ray.  Differential Diagnosis:   ACS, PE, pneumonia, among others  Independently Interpreted Test(s):   I have ordered and independently interpreted X-rays - see prior notes.  I have ordered and independently interpreted EKG Reading(s) - see prior notes  Clinical Tests:   Lab Tests: Ordered and Reviewed  The following lab test(s) were unremarkable: CBC, CMP and Troponin  Radiological Study: Ordered and Reviewed  Medical Tests: Ordered and Reviewed  ED Management:  Vital signs stable.  EKGs without acute ischemic changes, and troponin is within normal limits.  Do not think this is ACS, pain is very atypical and what is going on for days and is not exertional in nature, and is worse with moving her upper extremities in her neck.  Chest x-ray is clear, other lab workup is reassuring.  We will discharge with instructions to take anti-inflammatories and give referral to family Medicine, return precautions were given.                          Clinical Impression:   Final diagnoses:  [R07.9] Chest pain (Primary)        ED Disposition Condition    Discharge Stable          ED Prescriptions    None       Follow-up Information       Follow up With Specialties Details Why Contact Info    [facility] Emergency Medicine Go to  As  needed [address] [address]  [phone]             Kiran Akbar MD  08/12/23 0559

## 2023-09-19 ENCOUNTER — OFFICE VISIT (OUTPATIENT)
Dept: INTERNAL MEDICINE | Facility: CLINIC | Age: 33
End: 2023-09-19
Payer: MEDICAID

## 2023-09-19 VITALS
SYSTOLIC BLOOD PRESSURE: 99 MMHG | HEIGHT: 64 IN | TEMPERATURE: 98 F | WEIGHT: 225 LBS | HEART RATE: 63 BPM | RESPIRATION RATE: 16 BRPM | BODY MASS INDEX: 38.41 KG/M2 | DIASTOLIC BLOOD PRESSURE: 68 MMHG

## 2023-09-19 DIAGNOSIS — Z11.59 NEED FOR HEPATITIS C SCREENING TEST: ICD-10-CM

## 2023-09-19 DIAGNOSIS — Z00.00 WELLNESS EXAMINATION: Primary | ICD-10-CM

## 2023-09-19 DIAGNOSIS — Z11.3 SCREENING EXAMINATION FOR STD (SEXUALLY TRANSMITTED DISEASE): ICD-10-CM

## 2023-09-19 DIAGNOSIS — Z11.4 SCREENING FOR HIV (HUMAN IMMUNODEFICIENCY VIRUS): ICD-10-CM

## 2023-09-19 PROCEDURE — 99214 OFFICE O/P EST MOD 30 MIN: CPT | Mod: PBBFAC | Performed by: NURSE PRACTITIONER

## 2023-09-19 PROCEDURE — 99385 PREV VISIT NEW AGE 18-39: CPT | Mod: S$PBB,,, | Performed by: NURSE PRACTITIONER

## 2023-09-19 PROCEDURE — 3078F DIAST BP <80 MM HG: CPT | Mod: CPTII,,, | Performed by: NURSE PRACTITIONER

## 2023-09-19 PROCEDURE — 3008F BODY MASS INDEX DOCD: CPT | Mod: CPTII,,, | Performed by: NURSE PRACTITIONER

## 2023-09-19 PROCEDURE — 1160F PR REVIEW ALL MEDS BY PRESCRIBER/CLIN PHARMACIST DOCUMENTED: ICD-10-PCS | Mod: CPTII,,, | Performed by: NURSE PRACTITIONER

## 2023-09-19 PROCEDURE — 1159F PR MEDICATION LIST DOCUMENTED IN MEDICAL RECORD: ICD-10-PCS | Mod: CPTII,,, | Performed by: NURSE PRACTITIONER

## 2023-09-19 PROCEDURE — 1159F MED LIST DOCD IN RCRD: CPT | Mod: CPTII,,, | Performed by: NURSE PRACTITIONER

## 2023-09-19 PROCEDURE — 3074F SYST BP LT 130 MM HG: CPT | Mod: CPTII,,, | Performed by: NURSE PRACTITIONER

## 2023-09-19 PROCEDURE — 3074F PR MOST RECENT SYSTOLIC BLOOD PRESSURE < 130 MM HG: ICD-10-PCS | Mod: CPTII,,, | Performed by: NURSE PRACTITIONER

## 2023-09-19 PROCEDURE — 1160F RVW MEDS BY RX/DR IN RCRD: CPT | Mod: CPTII,,, | Performed by: NURSE PRACTITIONER

## 2023-09-19 PROCEDURE — 99385 PR PREVENTIVE VISIT,NEW,18-39: ICD-10-PCS | Mod: S$PBB,,, | Performed by: NURSE PRACTITIONER

## 2023-09-19 PROCEDURE — 3078F PR MOST RECENT DIASTOLIC BLOOD PRESSURE < 80 MM HG: ICD-10-PCS | Mod: CPTII,,, | Performed by: NURSE PRACTITIONER

## 2023-09-19 PROCEDURE — 3008F PR BODY MASS INDEX (BMI) DOCUMENTED: ICD-10-PCS | Mod: CPTII,,, | Performed by: NURSE PRACTITIONER

## 2023-09-19 RX ORDER — CITALOPRAM 10 MG/1
10 TABLET ORAL
COMMUNITY
Start: 2021-11-30 | End: 2023-09-19 | Stop reason: ALTCHOICE

## 2023-09-19 RX ORDER — LAMOTRIGINE 100 MG/1
100 TABLET ORAL
COMMUNITY
Start: 2023-09-11

## 2023-09-19 RX ORDER — VENLAFAXINE HYDROCHLORIDE 37.5 MG/1
37.5 CAPSULE, EXTENDED RELEASE ORAL
COMMUNITY
Start: 2021-11-30 | End: 2023-09-19 | Stop reason: ALTCHOICE

## 2023-09-19 RX ORDER — DEXTROAMPHETAMINE SACCHARATE, AMPHETAMINE ASPARTATE, DEXTROAMPHETAMINE SULFATE AND AMPHETAMINE SULFATE 2.5; 2.5; 2.5; 2.5 MG/1; MG/1; MG/1; MG/1
1 TABLET ORAL 2 TIMES DAILY
COMMUNITY
Start: 2023-08-21

## 2023-09-19 RX ORDER — ACETAMINOPHEN 500 MG
500 TABLET ORAL EVERY 6 HOURS PRN
COMMUNITY

## 2023-09-19 NOTE — PROGRESS NOTES
MATEUSZ Friedman   [facility]  [address] [address]      PATIENT NAME: Floridalma Desai  : 1990  DATE: 23  MRN: [id number]      Patient PCP Information       Provider PCP Type    MATEUSZ Friedman General            Reason for Visit / Chief Complaint: Establish Care       History of Present Illness / Problem Focused Workflow     Floridalma Desai presents to the clinic with Establish Care     31 yo AAF here today to establish care. PMH ADHD. Followed by Maggie Feliz at [facility].     Cervical Cancer Screening: Dr. Jesus   Osteoporosis Screenin23 Vitamin D Level  HCV Screenin23  Wellness Screenin2023  Pt here today to establish care, is agreeable to routine wellness lab work and then f/u for review. Discussed HM topics and histories.   Pt with recent ED visit in August with Chest Pain, acute work up including labs, EKG, and chest x-ray were negative. Denies any issus today.   Denies chest pain, shortness of breath, cough, fever, headache, dizziness, weakness, abdominal pain, nausea, vomiting, diarrhea, constipation, black/bloody stools, unplanned weight loss, night sweats, changes in urinary patterns, burning/odor with urination, depression, anxiety, and SI/HI.             Review of Systems     Review of Systems   Constitutional: Negative.    HENT: Negative.     Eyes: Negative.    Respiratory: Negative.     Cardiovascular: Negative.    Gastrointestinal: Negative.    Endocrine: Negative.    Genitourinary: Negative.    Neurological: Negative.    Psychiatric/Behavioral: Negative.           Medications and Allergies     Medications  Current Outpatient Medications   Medication Instructions    acetaminophen (TYLENOL) 500 mg, Oral, Every 6 hours PRN    dextroamphetamine-amphetamine 10 mg Tab 1 tablet, Oral, 2 times daily    lamoTRIgine (LAMICTAL) 100 mg, Oral         Allergies  Review of patient's  "allergies indicates:  No Known Allergies    Physical Examination     Visit Vitals  BP 99/68   Pulse 63   Temp 97.9 °F (36.6 °C)   Resp 16   Ht 5' 4" (1.626 m)   Wt 102.1 kg (225 lb)   LMP 08/27/2023   BMI 38.62 kg/m²       Physical Exam  Vitals reviewed.   Constitutional:       Appearance: Normal appearance. She is normal weight.   HENT:      Head: Normocephalic.   Cardiovascular:      Rate and Rhythm: Normal rate and regular rhythm.      Pulses: Normal pulses.      Heart sounds: Normal heart sounds.   Pulmonary:      Effort: Pulmonary effort is normal.      Breath sounds: Normal breath sounds.   Abdominal:      General: Abdomen is flat.      Palpations: Abdomen is soft.   Musculoskeletal:         General: Normal range of motion.      Cervical back: Normal range of motion.   Skin:     General: Skin is warm and dry.   Neurological:      Mental Status: She is alert.   Psychiatric:         Mood and Affect: Mood normal.           Results     Lab Results   Component Value Date    WBC 6.45 08/12/2023    RBC 4.44 08/12/2023    HGB 12.8 08/12/2023    HCT 39.1 08/12/2023    MCV 88.1 08/12/2023    MCH 28.8 08/12/2023    MCHC 32.7 (L) 08/12/2023    RDW 14.3 08/12/2023     08/12/2023    MPV 9.3 08/12/2023     CMP  Sodium   Date Value Ref Range Status   02/19/2022 136 136 - 145 mmol/L Final     Sodium Level   Date Value Ref Range Status   08/12/2023 137 136 - 145 mmol/L Final     Potassium   Date Value Ref Range Status   02/19/2022 4.5 3.5 - 5.1 mmol/L Final     Potassium Level   Date Value Ref Range Status   08/12/2023 3.9 3.5 - 5.1 mmol/L Final     Chloride   Date Value Ref Range Status   02/19/2022 105 100 - 109 mmol/L Final     Carbon Dioxide   Date Value Ref Range Status   08/12/2023 21 (L) 22 - 29 mmol/L Final   02/19/2022 21 (L) 22 - 33 mmol/L Final     Blood Urea Nitrogen   Date Value Ref Range Status   08/12/2023 13.4 7.0 - 18.7 mg/dL Final   02/19/2022 15 5 - 25 mg/dL Final     Creatinine   Date Value Ref Range " "Status   08/12/2023 0.65 0.55 - 1.02 mg/dL Final   02/19/2022 0.85 0.57 - 1.25 mg/dL Final     Calcium   Date Value Ref Range Status   02/19/2022 8.9 8.8 - 10.6 mg/dL Final     Calcium Level Total   Date Value Ref Range Status   08/12/2023 8.8 8.4 - 10.2 mg/dL Final     Albumin Level   Date Value Ref Range Status   08/12/2023 3.6 3.5 - 5.0 g/dL Final     Bilirubin Total   Date Value Ref Range Status   08/12/2023 0.2 <=1.5 mg/dL Final     Alkaline Phosphatase   Date Value Ref Range Status   08/12/2023 48 40 - 150 unit/L Final     Aspartate Aminotransferase   Date Value Ref Range Status   08/12/2023 14 5 - 34 unit/L Final     Alanine Aminotransferase   Date Value Ref Range Status   08/12/2023 14 0 - 55 unit/L Final     Anion Gap   Date Value Ref Range Status   02/19/2022 10 8 - 16 mmol/L Final     eGFR    Date Value Ref Range Status   02/19/2022 95 >=60 mL/min/1.73mSq Final     Estimated GFR-Non    Date Value Ref Range Status   03/04/2022 >60       No results found for: "CHOL"  No results found for: "HDL"  No results found for: "LDLCALC"  No results found for: "TRIG"  No results found for: "CHOLHDL"  No results found for: "TSH"      Assessment        ICD-10-CM ICD-9-CM   1. Wellness examination  Z00.00 V70.0   2. Screening for HIV (human immunodeficiency virus)  Z11.4 V73.89   3. Screening examination for STD (sexually transmitted disease)  Z11.3 V74.5   4. Need for hepatitis C screening test  Z11.59 V73.89        Plan      Problem List Items Addressed This Visit          Other    Wellness examination - Primary    Current Assessment & Plan     Pt wellness visit completed today with appropriate lab work.            Relevant Orders    T4, Free    TSH    Hemoglobin A1C    Vitamin D    Comprehensive Metabolic Panel    Urinalysis, Reflex to Urine Culture    Lipid Panel    CBC Auto Differential     Other Visit Diagnoses       Screening for HIV (human immunodeficiency virus)        Relevant " Orders    HIV 1/2 Ag/Ab (4th Gen)    Screening examination for STD (sexually transmitted disease)        Relevant Orders    Chlamydia/GC, PCR    SYPHILIS ANTIBODY (WITH REFLEX RPR)    Need for hepatitis C screening test        Relevant Orders    Hepatitis C Antibody            Future Appointments   Date Time Provider Department Center   9/19/2023 10:00 AM LAB, [facility]   9/27/2023 12:45 PM Myesha Tate FNP [facility]         Follow up in about 8 days (around 9/27/2023) for Established Virtual - Wellness Lab Review.      Signature:     [facility]  [address] [address]    Date of encounter: 9/19/23

## 2023-09-27 ENCOUNTER — OFFICE VISIT (OUTPATIENT)
Dept: INTERNAL MEDICINE | Facility: CLINIC | Age: 33
End: 2023-09-27
Payer: MEDICAID

## 2023-09-27 DIAGNOSIS — E78.2 MIXED HYPERLIPIDEMIA: ICD-10-CM

## 2023-09-27 DIAGNOSIS — Z00.00 WELLNESS EXAMINATION: ICD-10-CM

## 2023-09-27 DIAGNOSIS — E55.9 VITAMIN D DEFICIENCY: Primary | ICD-10-CM

## 2023-09-27 PROCEDURE — 1160F RVW MEDS BY RX/DR IN RCRD: CPT | Mod: CPTII,95,, | Performed by: NURSE PRACTITIONER

## 2023-09-27 PROCEDURE — 1160F PR REVIEW ALL MEDS BY PRESCRIBER/CLIN PHARMACIST DOCUMENTED: ICD-10-PCS | Mod: CPTII,95,, | Performed by: NURSE PRACTITIONER

## 2023-09-27 PROCEDURE — 3044F HG A1C LEVEL LT 7.0%: CPT | Mod: CPTII,95,, | Performed by: NURSE PRACTITIONER

## 2023-09-27 PROCEDURE — 3044F PR MOST RECENT HEMOGLOBIN A1C LEVEL <7.0%: ICD-10-PCS | Mod: CPTII,95,, | Performed by: NURSE PRACTITIONER

## 2023-09-27 PROCEDURE — 99213 PR OFFICE/OUTPT VISIT, EST, LEVL III, 20-29 MIN: ICD-10-PCS | Mod: 95,,, | Performed by: NURSE PRACTITIONER

## 2023-09-27 PROCEDURE — 99213 OFFICE O/P EST LOW 20 MIN: CPT | Mod: 95,,, | Performed by: NURSE PRACTITIONER

## 2023-09-27 PROCEDURE — 1159F PR MEDICATION LIST DOCUMENTED IN MEDICAL RECORD: ICD-10-PCS | Mod: CPTII,95,, | Performed by: NURSE PRACTITIONER

## 2023-09-27 PROCEDURE — 1159F MED LIST DOCD IN RCRD: CPT | Mod: CPTII,95,, | Performed by: NURSE PRACTITIONER

## 2023-09-27 NOTE — PROGRESS NOTES
Myesha Tate, MATEUSZ   [facility]  [address] [address] [address]      PATIENT NAME: Floridalma Desai  : 1990  DATE: 23  MRN: [id number]      Reason for Visit / Chief Complaint: Health Maintenance (Lab review )       History of Present Illness / Problem Focused Workflow     Floridalma Desai presents to the clinic with Health Maintenance (Lab review )     33 yo AAF here today to establish care. PMH ADHD. Followed by Maggie Feliz at [facility].     Cervical Cancer Screening: Dr. Jesus   Osteoporosis Screenin23 Vitamin D Level  HCV Screenin23  Wellness Screenin2023  Pt here today to establish care, is agreeable to routine wellness lab work and then f/u for review. Discussed HM topics and histories.   Pt with recent ED visit in August with Chest Pain, acute work up including labs, EKG, and chest x-ray were negative. Denies any issus today.     2023  Pt here today for f/u via virtual for lab review. Labs discussed with pt. Discussed low Vitamin D, will start OTC Vitamin D. Also discussed abnormal FLP. Discussed with pt lifestyle & diet modifications along with fish oil / flax seed oil supplements, pt verbalized understanding.   Denies chest pain, shortness of breath, cough, fever, headache, dizziness, weakness, abdominal pain, nausea, vomiting, diarrhea, constipation, black/bloody stools, unplanned weight loss, night sweats, changes in urinary patterns, burning/odor with urination, depression, anxiety, and SI/HI.             Review of Systems     Review of Systems   Constitutional: Negative.  Negative for activity change and unexpected weight change.   HENT: Negative.  Negative for hearing loss, rhinorrhea and trouble swallowing.    Eyes: Negative.  Negative for discharge and visual disturbance.   Respiratory: Negative.  Negative for chest tightness and wheezing.    Cardiovascular: Negative.  Negative for  chest pain and palpitations.   Gastrointestinal: Negative.  Negative for blood in stool, constipation, diarrhea and vomiting.   Endocrine: Negative.  Negative for polydipsia and polyuria.   Genitourinary: Negative.  Negative for difficulty urinating, dysuria, hematuria and menstrual problem.   Musculoskeletal:  Negative for arthralgias, joint swelling and neck pain.   Neurological: Negative.  Negative for weakness and headaches.   Psychiatric/Behavioral: Negative.  Negative for confusion and dysphoric mood.          Medications and Allergies     Medications  Medication List with Changes/Refills   Current Medications    ACETAMINOPHEN (TYLENOL) 500 MG TABLET    Take 500 mg by mouth every 6 (six) hours as needed.    DEXTROAMPHETAMINE-AMPHETAMINE 10 MG TAB    Take 1 tablet by mouth 2 (two) times daily.    LAMOTRIGINE (LAMICTAL) 100 MG TABLET    Take 100 mg by mouth.         Allergies  Review of patient's allergies indicates:  No Known Allergies    Physical Examination   There were no vitals filed for this visit.  Physical Exam  HENT:      Right Ear: Hearing normal.      Left Ear: Hearing normal.   Neurological:      Mental Status: She is alert and oriented to person, place, and time.   Psychiatric:         Mood and Affect: Mood normal.           Results     Lab Results   Component Value Date    WBC 6.78 09/19/2023    RBC 4.94 09/19/2023    HGB 14.0 09/19/2023    HCT 43.6 09/19/2023    MCV 88.3 09/19/2023    MCH 28.3 09/19/2023    MCHC 32.1 (L) 09/19/2023    RDW 13.8 09/19/2023     09/19/2023    MPV 9.5 09/19/2023     Sodium   Date Value Ref Range Status   02/19/2022 136 136 - 145 mmol/L Final     Sodium Level   Date Value Ref Range Status   09/19/2023 136 136 - 145 mmol/L Final     Potassium   Date Value Ref Range Status   02/19/2022 4.5 3.5 - 5.1 mmol/L Final     Potassium Level   Date Value Ref Range Status   09/19/2023 4.0 3.5 - 5.1 mmol/L Final     Chloride   Date Value Ref Range Status   02/19/2022 105 100 -  "109 mmol/L Final     Carbon Dioxide   Date Value Ref Range Status   09/19/2023 26 22 - 29 mmol/L Final   02/19/2022 21 (L) 22 - 33 mmol/L Final     Blood Urea Nitrogen   Date Value Ref Range Status   09/19/2023 11.5 7.0 - 18.7 mg/dL Final   02/19/2022 15 5 - 25 mg/dL Final     Creatinine   Date Value Ref Range Status   09/19/2023 0.66 0.55 - 1.02 mg/dL Final   02/19/2022 0.85 0.57 - 1.25 mg/dL Final     Calcium   Date Value Ref Range Status   02/19/2022 8.9 8.8 - 10.6 mg/dL Final     Calcium Level Total   Date Value Ref Range Status   09/19/2023 9.5 8.4 - 10.2 mg/dL Final     Albumin Level   Date Value Ref Range Status   09/19/2023 4.1 3.5 - 5.0 g/dL Final     Bilirubin Total   Date Value Ref Range Status   09/19/2023 0.3 <=1.5 mg/dL Final     Alkaline Phosphatase   Date Value Ref Range Status   09/19/2023 54 40 - 150 unit/L Final     Aspartate Aminotransferase   Date Value Ref Range Status   09/19/2023 14 5 - 34 unit/L Final     Alanine Aminotransferase   Date Value Ref Range Status   09/19/2023 15 0 - 55 unit/L Final     Anion Gap   Date Value Ref Range Status   02/19/2022 10 8 - 16 mmol/L Final     eGFR    Date Value Ref Range Status   02/19/2022 95 >=60 mL/min/1.73mSq Final     Estimated GFR-Non    Date Value Ref Range Status   03/04/2022 >60       Lab Results   Component Value Date    CHOL 215 (H) 09/19/2023     Lab Results   Component Value Date    HDL 43 09/19/2023     No results found for: "LDLCALC"  Lab Results   Component Value Date    TRIG 238 (H) 09/19/2023     No results found for: "CHOLHDL"  Lab Results   Component Value Date    TSH 1.523 09/19/2023     Lab Results   Component Value Date    PHUR 6.0 04/16/2022    PROTEINUA Negative 09/19/2023    GLUCUA Negative 04/16/2022    KETONESU Negative 04/16/2022    OCCULTUA Negative 04/16/2022    NITRITE Negative 04/16/2022    LEUKOCYTESUR Negative 09/19/2023     Lab Results   Component Value Date    HGBA1C 5.0 09/19/2023 "           Assessment         ICD-10-CM ICD-9-CM   1. Vitamin D deficiency  E55.9 268.9   2. Mixed hyperlipidemia  E78.2 272.2   3. Wellness examination  Z00.00 V70.0       Plan      Problem List Items Addressed This Visit          Cardiac/Vascular    Mixed hyperlipidemia    Overview     Follow a low cholesterol, low saturated fat diet with less than 200 mg of cholesterol a day.   Avoid fried foods and high saturated fats (clifford, sausage, cookies, cakes, chips, cheese, whole milk, butter, mayonnaise, creamy dressings, gravy, stew, gumbo, boudin, cracklins and cream sauces).  Add flax seed or fish oil supplements to diet.   Increase dietary fiber.   Regular exercise improves cholesterol levels.  Physical activity 5 times a week for 30 minutes per day (or 150 minutes per week).   Stressed importance of dietary modifications.           Current Assessment & Plan      Latest Reference Range & Units 09/19/23 09:59   Cholesterol <=200 mg/dL 215 (H)   HDL 35 - 60 mg/dL 43   LDL Cholesterol External 50.00 - 140.00 mg/dL 124.00   Total Cholesterol/HDL Ratio 0 - 5  5   Triglycerides 37 - 140 mg/dL 238 (H)   Very Low Density Lipoprotein  48   (H): Data is abnormally high            Endocrine    Vitamin D deficiency - Primary    Overview     Educated on increasing foods high in Vitamin D such as fish oil, cod liver oil, salmon, milk fortified with vitamin D.             Current Assessment & Plan      Vitamin D 2000 I.U. tablets daily (purchase over the counter).  Repeat Vitamin D level as ordered.            Other    Wellness examination    Relevant Orders    Hemoglobin A1C    Vitamin D    Urinalysis, Reflex to Urine Culture    T4, Free    TSH    Lipid Panel    Comprehensive Metabolic Panel    CBC Auto Differential       No future appointments.           Signature:     [facility]  [address] [id number]    Date of encounter: 9/27/23      The patient  location is: home  The chief complaint leading to consultation is: lab review    Visit type: audiovisual    Face to Face time with patient: 10  20 minutes of total time spent on the encounter, which includes face to face time and non-face to face time preparing to see the patient (eg, review of tests), Obtaining and/or reviewing separately obtained history, Documenting clinical information in the electronic or other health record, Independently interpreting results (not separately reported) and communicating results to the patient/family/caregiver, or Care coordination (not separately reported).         Each patient to whom he or she provides medical services by telemedicine is:  (1) informed of the relationship between the physician and patient and the respective role of any other health care provider with respect to management of the patient; and (2) notified that he or she may decline to receive medical services by telemedicine and may withdraw from such care at any time.    Notes:

## 2023-09-27 NOTE — ASSESSMENT & PLAN NOTE
Vitamin D 2000 I.U. tablets daily (purchase over the counter).  Repeat Vitamin D level as ordered.

## 2023-09-27 NOTE — ASSESSMENT & PLAN NOTE
Latest Reference Range & Units 09/19/23 09:59   Cholesterol <=200 mg/dL 215 (H)   HDL 35 - 60 mg/dL 43   LDL Cholesterol External 50.00 - 140.00 mg/dL 124.00   Total Cholesterol/HDL Ratio 0 - 5  5   Triglycerides 37 - 140 mg/dL 238 (H)   Very Low Density Lipoprotein  48   (H): Data is abnormally high

## 2023-10-16 ENCOUNTER — PATIENT MESSAGE (OUTPATIENT)
Dept: ADMINISTRATIVE | Facility: HOSPITAL | Age: 33
End: 2023-10-16
Payer: MEDICAID

## 2023-12-25 PROBLEM — Z00.00 WELLNESS EXAMINATION: Status: RESOLVED | Noted: 2023-09-19 | Resolved: 2023-12-25
